# Patient Record
Sex: MALE | Race: OTHER | URBAN - METROPOLITAN AREA
[De-identification: names, ages, dates, MRNs, and addresses within clinical notes are randomized per-mention and may not be internally consistent; named-entity substitution may affect disease eponyms.]

---

## 2023-01-18 ENCOUNTER — INPATIENT (INPATIENT)
Facility: HOSPITAL | Age: 53
LOS: 3 days | Discharge: HOME | End: 2023-01-22
Attending: SURGERY | Admitting: SURGERY
Payer: COMMERCIAL

## 2023-01-18 VITALS
OXYGEN SATURATION: 99 % | TEMPERATURE: 98 F | WEIGHT: 279.99 LBS | HEART RATE: 84 BPM | DIASTOLIC BLOOD PRESSURE: 78 MMHG | RESPIRATION RATE: 16 BRPM | SYSTOLIC BLOOD PRESSURE: 180 MMHG

## 2023-01-18 LAB
ALBUMIN SERPL ELPH-MCNC: 4.8 G/DL — SIGNIFICANT CHANGE UP (ref 3.5–5.2)
ALP SERPL-CCNC: 48 U/L — SIGNIFICANT CHANGE UP (ref 30–115)
ALT FLD-CCNC: 67 U/L — HIGH (ref 0–41)
ANION GAP SERPL CALC-SCNC: 10 MMOL/L — SIGNIFICANT CHANGE UP (ref 7–14)
ANION GAP SERPL CALC-SCNC: 8 MMOL/L — SIGNIFICANT CHANGE UP (ref 7–14)
APTT BLD: 30.3 SEC — SIGNIFICANT CHANGE UP (ref 27–39.2)
AST SERPL-CCNC: 50 U/L — HIGH (ref 0–41)
BASOPHILS # BLD AUTO: 0.07 K/UL — SIGNIFICANT CHANGE UP (ref 0–0.2)
BASOPHILS NFR BLD AUTO: 0.6 % — SIGNIFICANT CHANGE UP (ref 0–1)
BILIRUB SERPL-MCNC: 0.5 MG/DL — SIGNIFICANT CHANGE UP (ref 0.2–1.2)
BLD GP AB SCN SERPL QL: SIGNIFICANT CHANGE UP
BUN SERPL-MCNC: 13 MG/DL — SIGNIFICANT CHANGE UP (ref 10–20)
BUN SERPL-MCNC: 18 MG/DL — SIGNIFICANT CHANGE UP (ref 10–20)
CALCIUM SERPL-MCNC: 9.1 MG/DL — SIGNIFICANT CHANGE UP (ref 8.4–10.4)
CALCIUM SERPL-MCNC: 9.8 MG/DL — SIGNIFICANT CHANGE UP (ref 8.4–10.5)
CHLORIDE SERPL-SCNC: 104 MMOL/L — SIGNIFICANT CHANGE UP (ref 98–110)
CHLORIDE SERPL-SCNC: 104 MMOL/L — SIGNIFICANT CHANGE UP (ref 98–110)
CK SERPL-CCNC: 425 U/L — HIGH (ref 0–225)
CO2 SERPL-SCNC: 25 MMOL/L — SIGNIFICANT CHANGE UP (ref 17–32)
CO2 SERPL-SCNC: 28 MMOL/L — SIGNIFICANT CHANGE UP (ref 17–32)
CREAT SERPL-MCNC: 1 MG/DL — SIGNIFICANT CHANGE UP (ref 0.7–1.5)
CREAT SERPL-MCNC: 1.1 MG/DL — SIGNIFICANT CHANGE UP (ref 0.7–1.5)
EGFR: 80 ML/MIN/1.73M2 — SIGNIFICANT CHANGE UP
EGFR: 90 ML/MIN/1.73M2 — SIGNIFICANT CHANGE UP
EOSINOPHIL # BLD AUTO: 0.26 K/UL — SIGNIFICANT CHANGE UP (ref 0–0.7)
EOSINOPHIL NFR BLD AUTO: 2.3 % — SIGNIFICANT CHANGE UP (ref 0–8)
GLUCOSE SERPL-MCNC: 100 MG/DL — HIGH (ref 70–99)
GLUCOSE SERPL-MCNC: 106 MG/DL — HIGH (ref 70–99)
HCT VFR BLD CALC: 42.6 % — SIGNIFICANT CHANGE UP (ref 42–52)
HCT VFR BLD CALC: 45 % — SIGNIFICANT CHANGE UP (ref 42–52)
HGB BLD-MCNC: 14.3 G/DL — SIGNIFICANT CHANGE UP (ref 14–18)
HGB BLD-MCNC: 15.1 G/DL — SIGNIFICANT CHANGE UP (ref 14–18)
IMM GRANULOCYTES NFR BLD AUTO: 0.4 % — HIGH (ref 0.1–0.3)
INR BLD: 1.03 RATIO — SIGNIFICANT CHANGE UP (ref 0.65–1.3)
LACTATE SERPL-SCNC: 1.2 MMOL/L — SIGNIFICANT CHANGE UP (ref 0.7–2)
LIDOCAIN IGE QN: 25 U/L — SIGNIFICANT CHANGE UP (ref 7–60)
LYMPHOCYTES # BLD AUTO: 1.81 K/UL — SIGNIFICANT CHANGE UP (ref 1.2–3.4)
LYMPHOCYTES # BLD AUTO: 16 % — LOW (ref 20.5–51.1)
MAGNESIUM SERPL-MCNC: 2 MG/DL — SIGNIFICANT CHANGE UP (ref 1.8–2.4)
MCHC RBC-ENTMCNC: 29.5 PG — SIGNIFICANT CHANGE UP (ref 27–31)
MCHC RBC-ENTMCNC: 29.8 PG — SIGNIFICANT CHANGE UP (ref 27–31)
MCHC RBC-ENTMCNC: 33.6 G/DL — SIGNIFICANT CHANGE UP (ref 32–37)
MCHC RBC-ENTMCNC: 33.6 G/DL — SIGNIFICANT CHANGE UP (ref 32–37)
MCV RBC AUTO: 88 FL — SIGNIFICANT CHANGE UP (ref 80–94)
MCV RBC AUTO: 88.8 FL — SIGNIFICANT CHANGE UP (ref 80–94)
MONOCYTES # BLD AUTO: 0.54 K/UL — SIGNIFICANT CHANGE UP (ref 0.1–0.6)
MONOCYTES NFR BLD AUTO: 4.8 % — SIGNIFICANT CHANGE UP (ref 1.7–9.3)
NEUTROPHILS # BLD AUTO: 8.61 K/UL — HIGH (ref 1.4–6.5)
NEUTROPHILS NFR BLD AUTO: 75.9 % — HIGH (ref 42.2–75.2)
NRBC # BLD: 0 /100 WBCS — SIGNIFICANT CHANGE UP (ref 0–0)
NRBC # BLD: 0 /100 WBCS — SIGNIFICANT CHANGE UP (ref 0–0)
PHOSPHATE SERPL-MCNC: 3.1 MG/DL — SIGNIFICANT CHANGE UP (ref 2.1–4.9)
PLATELET # BLD AUTO: 201 K/UL — SIGNIFICANT CHANGE UP (ref 130–400)
PLATELET # BLD AUTO: 206 K/UL — SIGNIFICANT CHANGE UP (ref 130–400)
POTASSIUM SERPL-MCNC: 4.3 MMOL/L — SIGNIFICANT CHANGE UP (ref 3.5–5)
POTASSIUM SERPL-MCNC: 4.5 MMOL/L — SIGNIFICANT CHANGE UP (ref 3.5–5)
POTASSIUM SERPL-SCNC: 4.3 MMOL/L — SIGNIFICANT CHANGE UP (ref 3.5–5)
POTASSIUM SERPL-SCNC: 4.5 MMOL/L — SIGNIFICANT CHANGE UP (ref 3.5–5)
PROT SERPL-MCNC: 8.1 G/DL — HIGH (ref 6–8)
PROTHROM AB SERPL-ACNC: 11.7 SEC — SIGNIFICANT CHANGE UP (ref 9.95–12.87)
RBC # BLD: 4.84 M/UL — SIGNIFICANT CHANGE UP (ref 4.7–6.1)
RBC # BLD: 5.07 M/UL — SIGNIFICANT CHANGE UP (ref 4.7–6.1)
RBC # FLD: 13.6 % — SIGNIFICANT CHANGE UP (ref 11.5–14.5)
RBC # FLD: 13.6 % — SIGNIFICANT CHANGE UP (ref 11.5–14.5)
SARS-COV-2 RNA SPEC QL NAA+PROBE: SIGNIFICANT CHANGE UP
SODIUM SERPL-SCNC: 139 MMOL/L — SIGNIFICANT CHANGE UP (ref 135–146)
SODIUM SERPL-SCNC: 140 MMOL/L — SIGNIFICANT CHANGE UP (ref 135–146)
WBC # BLD: 10.81 K/UL — HIGH (ref 4.8–10.8)
WBC # BLD: 11.34 K/UL — HIGH (ref 4.8–10.8)
WBC # FLD AUTO: 10.81 K/UL — HIGH (ref 4.8–10.8)
WBC # FLD AUTO: 11.34 K/UL — HIGH (ref 4.8–10.8)

## 2023-01-18 PROCEDURE — 73030 X-RAY EXAM OF SHOULDER: CPT | Mod: 26,RT

## 2023-01-18 PROCEDURE — 73562 X-RAY EXAM OF KNEE 3: CPT | Mod: 26,RT

## 2023-01-18 PROCEDURE — 73100 X-RAY EXAM OF WRIST: CPT | Mod: 26,LT

## 2023-01-18 PROCEDURE — 74177 CT ABD & PELVIS W/CONTRAST: CPT | Mod: 26,MA

## 2023-01-18 PROCEDURE — 73120 X-RAY EXAM OF HAND: CPT | Mod: 26,LT

## 2023-01-18 PROCEDURE — 99285 EMERGENCY DEPT VISIT HI MDM: CPT

## 2023-01-18 PROCEDURE — 72146 MRI CHEST SPINE W/O DYE: CPT | Mod: 26

## 2023-01-18 PROCEDURE — 99223 1ST HOSP IP/OBS HIGH 75: CPT

## 2023-01-18 PROCEDURE — 70450 CT HEAD/BRAIN W/O DYE: CPT | Mod: 26,MA

## 2023-01-18 PROCEDURE — 71260 CT THORAX DX C+: CPT | Mod: 26,MA

## 2023-01-18 PROCEDURE — 72125 CT NECK SPINE W/O DYE: CPT | Mod: 26,MA

## 2023-01-18 RX ORDER — SODIUM CHLORIDE 9 MG/ML
1000 INJECTION, SOLUTION INTRAVENOUS
Refills: 0 | Status: DISCONTINUED | OUTPATIENT
Start: 2023-01-18 | End: 2023-01-19

## 2023-01-18 RX ORDER — OXYCODONE HYDROCHLORIDE 5 MG/1
5 TABLET ORAL EVERY 6 HOURS
Refills: 0 | Status: DISCONTINUED | OUTPATIENT
Start: 2023-01-18 | End: 2023-01-20

## 2023-01-18 RX ORDER — HYDROMORPHONE HYDROCHLORIDE 2 MG/ML
0.5 INJECTION INTRAMUSCULAR; INTRAVENOUS; SUBCUTANEOUS ONCE
Refills: 0 | Status: DISCONTINUED | OUTPATIENT
Start: 2023-01-18 | End: 2023-01-18

## 2023-01-18 RX ORDER — HYDROMORPHONE HYDROCHLORIDE 2 MG/ML
0.5 INJECTION INTRAMUSCULAR; INTRAVENOUS; SUBCUTANEOUS EVERY 4 HOURS
Refills: 0 | Status: DISCONTINUED | OUTPATIENT
Start: 2023-01-18 | End: 2023-01-18

## 2023-01-18 RX ORDER — PANTOPRAZOLE SODIUM 20 MG/1
40 TABLET, DELAYED RELEASE ORAL
Refills: 0 | Status: DISCONTINUED | OUTPATIENT
Start: 2023-01-18 | End: 2023-01-22

## 2023-01-18 RX ORDER — ENOXAPARIN SODIUM 100 MG/ML
40 INJECTION SUBCUTANEOUS EVERY 24 HOURS
Refills: 0 | Status: DISCONTINUED | OUTPATIENT
Start: 2023-01-18 | End: 2023-01-22

## 2023-01-18 RX ORDER — GABAPENTIN 400 MG/1
300 CAPSULE ORAL EVERY 8 HOURS
Refills: 0 | Status: DISCONTINUED | OUTPATIENT
Start: 2023-01-18 | End: 2023-01-22

## 2023-01-18 RX ORDER — SENNA PLUS 8.6 MG/1
2 TABLET ORAL AT BEDTIME
Refills: 0 | Status: DISCONTINUED | OUTPATIENT
Start: 2023-01-18 | End: 2023-01-22

## 2023-01-18 RX ORDER — MORPHINE SULFATE 50 MG/1
6 CAPSULE, EXTENDED RELEASE ORAL ONCE
Refills: 0 | Status: DISCONTINUED | OUTPATIENT
Start: 2023-01-18 | End: 2023-01-18

## 2023-01-18 RX ORDER — DEXAMETHASONE 0.5 MG/5ML
10 ELIXIR ORAL ONCE
Refills: 0 | Status: COMPLETED | OUTPATIENT
Start: 2023-01-18 | End: 2023-01-18

## 2023-01-18 RX ORDER — SODIUM CHLORIDE 9 MG/ML
500 INJECTION INTRAMUSCULAR; INTRAVENOUS; SUBCUTANEOUS ONCE
Refills: 0 | Status: COMPLETED | OUTPATIENT
Start: 2023-01-18 | End: 2023-01-18

## 2023-01-18 RX ORDER — ONDANSETRON 8 MG/1
4 TABLET, FILM COATED ORAL ONCE
Refills: 0 | Status: COMPLETED | OUTPATIENT
Start: 2023-01-18 | End: 2023-01-18

## 2023-01-18 RX ORDER — MORPHINE SULFATE 50 MG/1
4 CAPSULE, EXTENDED RELEASE ORAL ONCE
Refills: 0 | Status: DISCONTINUED | OUTPATIENT
Start: 2023-01-18 | End: 2023-01-18

## 2023-01-18 RX ORDER — SODIUM CHLORIDE 9 MG/ML
1000 INJECTION INTRAMUSCULAR; INTRAVENOUS; SUBCUTANEOUS ONCE
Refills: 0 | Status: COMPLETED | OUTPATIENT
Start: 2023-01-18 | End: 2023-01-18

## 2023-01-18 RX ORDER — LIDOCAINE 4 G/100G
1 CREAM TOPICAL DAILY
Refills: 0 | Status: DISCONTINUED | OUTPATIENT
Start: 2023-01-18 | End: 2023-01-22

## 2023-01-18 RX ORDER — ACETAMINOPHEN 500 MG
650 TABLET ORAL EVERY 6 HOURS
Refills: 0 | Status: DISCONTINUED | OUTPATIENT
Start: 2023-01-18 | End: 2023-01-22

## 2023-01-18 RX ADMIN — MORPHINE SULFATE 6 MILLIGRAM(S): 50 CAPSULE, EXTENDED RELEASE ORAL at 14:33

## 2023-01-18 RX ADMIN — Medication 10 MILLIGRAM(S): at 15:58

## 2023-01-18 RX ADMIN — ONDANSETRON 4 MILLIGRAM(S): 8 TABLET, FILM COATED ORAL at 11:51

## 2023-01-18 RX ADMIN — HYDROMORPHONE HYDROCHLORIDE 0.5 MILLIGRAM(S): 2 INJECTION INTRAMUSCULAR; INTRAVENOUS; SUBCUTANEOUS at 18:52

## 2023-01-18 RX ADMIN — ENOXAPARIN SODIUM 40 MILLIGRAM(S): 100 INJECTION SUBCUTANEOUS at 21:06

## 2023-01-18 RX ADMIN — MORPHINE SULFATE 4 MILLIGRAM(S): 50 CAPSULE, EXTENDED RELEASE ORAL at 11:51

## 2023-01-18 RX ADMIN — GABAPENTIN 300 MILLIGRAM(S): 400 CAPSULE ORAL at 21:06

## 2023-01-18 RX ADMIN — OXYCODONE HYDROCHLORIDE 5 MILLIGRAM(S): 5 TABLET ORAL at 21:06

## 2023-01-18 RX ADMIN — Medication 650 MILLIGRAM(S): at 23:16

## 2023-01-18 RX ADMIN — Medication 650 MILLIGRAM(S): at 18:52

## 2023-01-18 RX ADMIN — SODIUM CHLORIDE 500 MILLILITER(S): 9 INJECTION INTRAMUSCULAR; INTRAVENOUS; SUBCUTANEOUS at 12:03

## 2023-01-18 RX ADMIN — SODIUM CHLORIDE 1000 MILLILITER(S): 9 INJECTION INTRAMUSCULAR; INTRAVENOUS; SUBCUTANEOUS at 16:07

## 2023-01-18 NOTE — H&P ADULT - NSHPPHYSICALEXAM_GEN_ALL_CORE
Primary Survey:    A - airway intact  B - bilateral breath sounds and good chest rise  C - palpable pulses in all extremities  D - GCS 15 on arrival, PINEDA  Exposure obtained    Vital Signs Last 24 Hrs  T(C): 36.6 (18 Jan 2023 10:30), Max: 36.6 (18 Jan 2023 10:30)  T(F): 97.9 (18 Jan 2023 10:30), Max: 97.9 (18 Jan 2023 10:30)  HR: 62 (18 Jan 2023 15:54) (62 - 84)  BP: 130/65 (18 Jan 2023 15:54) (100/54 - 180/78)  BP(mean): --  RR: 16 (18 Jan 2023 14:39) (16 - 16)  SpO2: 98% (18 Jan 2023 14:39) (98% - 99%)    Parameters below as of 18 Jan 2023 14:39  Patient On (Oxygen Delivery Method): room air        Secondary Survey:   General: NAD  HEENT: Normocephalic, atraumatic, EOMI, PEERLA. no scalp lacerations   Neck: Soft, midline trachea. no c-spine tenderness  Chest: +Right posterior chest wall tenderness, no subcutaneous emphysema   Cardiac: S1, S2, RRR  Respiratory: Bilateral breath sounds, clear and equal bilaterally. Decreased effort 2/2 pain. Unable to perform Incentive.  Abdomen: Soft, non-distended, mildly tender mostly in RUQ RLQ, no rebound, no guarding.  Groin: Normal appearing, pelvis stable   Ext:  Moving b/l upper and lower extremities. But decreased ROM on right 2/2 pain  Back: +Thoracic spine tenderness, - cervical or lumbar tenderness. No palpable runoff/stepoff/deformity
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
## 2023-01-18 NOTE — H&P ADULT - NSHPLABSRESULTS_GEN_ALL_CORE
3/31/2022    PRIMARY: Azucena Carrero NP      ASSESSMENT:    1. Orthostatic hypotension    2. Medication monitoring encounter        · Orthostatic hypotension:  Persists on exam today 20 mm rise in heart rate and 20 mm drop in blood pressure.  This is despite being very well hydrated and taking Florinef 0.1 mg daily.  Symptomatic episodes have significantly improved on Florinef.    PLAN:  · He will continue Florinef for now  · I recommend we do a trial off of Florinef and come in that week to recheck orthostatic readings.  If his symptoms recur off Florinef then we would obviously have to continue it.    Patient Instructions   Work letter sent to your [employer] account    Call the office when you would like to trial off the Florinef for a week so we can arrange a nurse blood pressure visit.     Jessica Yi MA, Caren SNOW, Latosha RN, Eboni RN and I were pleased to meet with you today.     HERE IS SOME IMPORTANT INFORMATION FOR OUR PATIENTS   If you need refills - call your pharmacist and they will contact our office.   If you have medical concerns after hours, and to make appointments, call [phone].  If you have had testing done, anticipate results to be relayed to you within 3-5 days.  If you have not heard from our staff, please contact the office.     If you have a question during office hours call [phone].  You will eventually speak with my nurses.  If you need to speak to me directly, let them know and I will get back to you as soon as possible.  Better yet, you can consider signing up for [email or web address], our popular online communication portal to ask for a refill or contact our staff.  Go to:  [email or web address].    We encourage use of the patient portal to view results and communicate directly with staff with non-urgent concerns.    Dr. Zeeshan Whitfield MD          No orders of the defined types were placed in this encounter.    Return in about 1 year (around 3/31/2023) for with pre clinic , CMP. Do  orthostatic vitals.    Kd Louis is a 36 year old male here for evaluation of:  1. Orthostatic hypotension    2. Medication monitoring encounter        SUBJECTIVE:  He had rare episodes of lightheadedness.  He has had no syncope.  He is taking Florinef 0.1 a day.  He keeps well hydrated.  No significant alcohol or caffeine intake.  He still runs a quite a bit.    Cardiac Diagnostics  Event Monitor 7/31/2018: Normal sinus rhythm  Echo 5/1/2018: Normal, EF 62%.  Stress Test 5/1/2018: Normal, no ST changes, no angina.    2018 consult:  Kd Louis is a 32 year old male who about a month ago had a viral syndrome with cough and fatigue. Since then he's had recurrent syncopal episodes. These will occur always after he gets up from a sitting or lying position and he'll feel suddenly lightheaded and then pass out. This happened 4 times in one day over the weekend and went to the emergency room. Workup showed that he was orthostatic with a drop in the blood pressure and a rise in heart rate with position change. He was treated with a liter of fluid. He has had another syncopal episode since then including this morning.     The patient is an avid runner. He has noted since this viral syndrome that his exercise capacity is worse. He's able to run but he is much more short of breath. No chest pain. He's had no syncope or near syncope while running.     He was a heavy alcohol user. Up to 6 beers to 1 L of vodka every day but he quit 90 days ago.     He had lymphoma when he was 18. He had chemotherapy and radiation to the chest area.     ROS:Review of systems as above otherwise negative.    Current Outpatient Medications   Medication Sig   • buPROPion XL (WELLBUTRIN XL) 150 MG 24 hr tablet Take 1 tablet by mouth 2 times daily.   • levothyroxine 88 MCG tablet Take 1 tablet by mouth daily.   • fludrocortisone (FLORINEF) 0.1 MG tablet TAKE 2 TABLETS BY MOUTH DAILY. SCHEDULE OFFICE VISIT FOR EDVIN YARBROUGH.   • busPIRone  (BUSPAR) 7.5 MG tablet TAKE 1 TABLET BY MOUTH TWICE DAILY   • OMEPRAZOLE PO Take 20 mg by mouth daily.    • fluticasone propionate (FLOVENT HFA) 110 MCG/ACT inhaler Inhale 1 puff into the lungs 2 times daily.   • albuterol 108 (90 Base) MCG/ACT inhaler INHALE 2 PUFFS INTO THE LUNGS EVERY 4 HOURS AS NEEDED FOR SHORTNESS OF BREATH     No current facility-administered medications for this visit.       OBJECTIVE:  I have reviewed the patient's medications and allergies, problem list, past medical, surgical, social and family history, spending a lot of time updating these as appropriate, especially the problem list.  See Histories section of the EMR for a display of this information.       PHYSICAL EXAM:  Vitals:    03/31/22 1050   BP: 125/87   Pulse: (!) 108     BP Readings from Last 4 Encounters:   03/31/22 125/87   02/11/22 120/88   02/04/22 138/82   12/20/21 122/80     Wt Readings from Last 4 Encounters:   03/31/22 98.4 kg (217 lb)   02/11/22 93.6 kg (206 lb 6.4 oz)   02/04/22 91.6 kg (202 lb)   12/20/21 94.8 kg (209 lb)     Body mass index is 30.27 kg/m².  General: no distress   Lungs: Clear to auscultation bilaterally  Heart: Regular rate and rhythm, S1, S2 normal, no murmur, click, rub or gallop    LABS:  Recent Labs   Lab 02/11/22  0926   TSH 4.337       Ejection Fraction   Date Value Ref Range Status   05/01/2018 62.0 % Final                                          Labs:                          15.1   11.34 )-----------( 206      ( 18 Jan 2023 12:00 )             45.0       Auto Neutrophil %: 75.9 % (01-18-23 @ 12:00)  Auto Immature Granulocyte %: 0.4 % (01-18-23 @ 12:00)    01-18    140  |  104  |  18  ----------------------------<  100<H>  4.5   |  28  |  1.1      Calcium, Total Serum: 9.8 mg/dL (01-18-23 @ 12:00)      LFTs:             8.1  | 0.5  | 50       ------------------[48      ( 18 Jan 2023 12:00 )  4.8  | x    | 67          Lipase:25     Amylase:x             Coags:     11.70  ----< 1.03    ( 18 Jan 2023 12:00 )     30.3              RADIOLOGY & ADDITIONAL STUDIES:    < from: CT Head No Cont (01.18.23 @ 12:05) >    IMPRESSION:    CT HEAD:  No acute intracranial findings.    CT CERVICAL SPINE:  No acute cervical fracture or facet subluxation.    --- End of Report ---    < end of copied text >    < from: CT Chest w/ IV Cont (01.18.23 @ 12:28) >    IMPRESSION:    *1.  Acute fracture along the right superolateral aspect of the T8   vertebral body and right posterior 8th rib adjacent to the costovertebral   junction, with small adjacent prevertebral hematoma and bilateral   posterior subpleural hematoma, right greater than left.  2. Otherwise, no definite evidence of acute traumatic injury within the   abdomen or pelvis.  3. Diffuse hepatic steatosis with indeterminate bilobar hypoattenuating   hepatic foci, measuring up to 1.8 cm within the right hepatic lobe;   further evaluation with nonemergent contrast-enhanced liver protocol MRI   is suggested when patient is able.      *Spoke with HARRY SUAZO MD; Attending Em on 1/18/2023 12:58 PM with   readback.    --- End of Report ---    < end of copied text >      ---------------------------------------------------------------------------------------

## 2023-01-18 NOTE — ED PROVIDER NOTE - PHYSICAL EXAMINATION
exam: nad, ncat, perrl, eomi, mmm, rrr, ctab, R lateral rib ttp, abd soft, ttp RLQ and LLQ, nd aox3, no calf tenderness, no pitting edema
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
## 2023-01-18 NOTE — ED PROVIDER NOTE - NS ED MD DISPO DIVISION
Expected Date Of Service: 05/14/2021 [facility] Bill For Surgical Tray: no Billing Type: Third-Party Bill
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
## 2023-01-19 PROCEDURE — 71045 X-RAY EXAM CHEST 1 VIEW: CPT | Mod: 26

## 2023-01-19 PROCEDURE — 99233 SBSQ HOSP IP/OBS HIGH 50: CPT

## 2023-01-19 RX ORDER — KETOROLAC TROMETHAMINE 30 MG/ML
15 SYRINGE (ML) INJECTION EVERY 6 HOURS
Refills: 0 | Status: DISCONTINUED | OUTPATIENT
Start: 2023-01-19 | End: 2023-01-19

## 2023-01-19 RX ORDER — KETOROLAC TROMETHAMINE 30 MG/ML
30 SYRINGE (ML) INJECTION EVERY 8 HOURS
Refills: 0 | Status: COMPLETED | OUTPATIENT
Start: 2023-01-19 | End: 2023-01-22

## 2023-01-19 RX ORDER — HYDROMORPHONE HYDROCHLORIDE 2 MG/ML
0.5 INJECTION INTRAMUSCULAR; INTRAVENOUS; SUBCUTANEOUS EVERY 4 HOURS
Refills: 0 | Status: DISCONTINUED | OUTPATIENT
Start: 2023-01-19 | End: 2023-01-20

## 2023-01-19 RX ADMIN — GABAPENTIN 300 MILLIGRAM(S): 400 CAPSULE ORAL at 05:35

## 2023-01-19 RX ADMIN — OXYCODONE HYDROCHLORIDE 5 MILLIGRAM(S): 5 TABLET ORAL at 21:45

## 2023-01-19 RX ADMIN — SENNA PLUS 2 TABLET(S): 8.6 TABLET ORAL at 21:45

## 2023-01-19 RX ADMIN — GABAPENTIN 300 MILLIGRAM(S): 400 CAPSULE ORAL at 21:44

## 2023-01-19 RX ADMIN — OXYCODONE HYDROCHLORIDE 5 MILLIGRAM(S): 5 TABLET ORAL at 05:36

## 2023-01-19 RX ADMIN — Medication 650 MILLIGRAM(S): at 18:19

## 2023-01-19 RX ADMIN — HYDROMORPHONE HYDROCHLORIDE 0.5 MILLIGRAM(S): 2 INJECTION INTRAMUSCULAR; INTRAVENOUS; SUBCUTANEOUS at 10:14

## 2023-01-19 RX ADMIN — Medication 30 MILLIGRAM(S): at 21:44

## 2023-01-19 RX ADMIN — ENOXAPARIN SODIUM 40 MILLIGRAM(S): 100 INJECTION SUBCUTANEOUS at 21:44

## 2023-01-19 RX ADMIN — HYDROMORPHONE HYDROCHLORIDE 0.5 MILLIGRAM(S): 2 INJECTION INTRAMUSCULAR; INTRAVENOUS; SUBCUTANEOUS at 09:46

## 2023-01-19 RX ADMIN — Medication 650 MILLIGRAM(S): at 00:00

## 2023-01-19 RX ADMIN — GABAPENTIN 300 MILLIGRAM(S): 400 CAPSULE ORAL at 15:59

## 2023-01-19 RX ADMIN — LIDOCAINE 1 PATCH: 4 CREAM TOPICAL at 15:58

## 2023-01-19 RX ADMIN — Medication 650 MILLIGRAM(S): at 05:35

## 2023-01-19 RX ADMIN — SODIUM CHLORIDE 125 MILLILITER(S): 9 INJECTION, SOLUTION INTRAVENOUS at 09:03

## 2023-01-19 RX ADMIN — PANTOPRAZOLE SODIUM 40 MILLIGRAM(S): 20 TABLET, DELAYED RELEASE ORAL at 05:35

## 2023-01-19 RX ADMIN — Medication 15 MILLIGRAM(S): at 13:10
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
## 2023-01-19 NOTE — PROGRESS NOTE ADULT - SUBJECTIVE AND OBJECTIVE BOX
TRAUMA SURGERY PROGRESS NOTE    Patient: LUZ WANG , 52y ([birth year])Male   MRN: [id number]  Location: [facility] ER Hold 012 A  Visit: 01-18-23 Inpatient  Date: 01-19-23 @ 07:04    Procedure/Dx/Injuries: T8 VB fx, R posterior 8th rib fx, b/l subpleural hematoma    Events of past 24 hours: MRI requested by neurosurgery showed no reilly epidural hematoma or cord compression with possible discontinuity in the anterior longitudinal ligament. Neurosurgery recommended TLSO brace, but until the brace is delivered he is to maintain strict spinal precautions and be log rolled if he needs to move. Patient reports improvement in his pain level, and the medications help control his pain. Denies N/V/dizziness.     PAST MEDICAL & SURGICAL HISTORY:      Vitals:   T(F): 97 (01-19-23 @ 05:00), Max: 98.8 (01-18-23 @ 18:48)  HR: 60 (01-19-23 @ 06:00)  BP: 117/74 (01-19-23 @ 06:00)  RR: 18 (01-19-23 @ 06:00)  SpO2: 97% (01-19-23 @ 06:00)          Fluids: lactated ringers.: Solution, 1000 milliLiter(s) infuse at 125 mL/Hr      I & O's:    PHYSICAL EXAM:  General: NAD  HEENT: Normocephalic, atraumatic  Neck: Soft, midline trachea. no c-spine tenderness  Chest: +Right posterior chest wall tenderness, no subcutaneous emphysema   Cardiac: S1, S2  Respiratory: Bilateral breath sounds, clear and equal bilaterally. Decreased effort 2/2 pain  Abdomen: Soft, non-distended, mildly tender mostly in RUQ RLQ (improving), no rebound, no guarding.  Ext:  Moving b/l upper and lower extremities. But decreased ROM on right 2/2 pain  Back: +Thoracic spine tenderness, - cervical or lumbar tenderness    MEDICATIONS  (STANDING):  acetaminophen     Tablet .. 650 milliGRAM(s) Oral every 6 hours  enoxaparin Injectable 40 milliGRAM(s) SubCutaneous every 24 hours  gabapentin 300 milliGRAM(s) Oral every 8 hours  lactated ringers. 1000 milliLiter(s) (125 mL/Hr) IV Continuous <Continuous>  lidocaine   4% Patch 1 Patch Transdermal daily  pantoprazole    Tablet 40 milliGRAM(s) Oral before breakfast  senna 2 Tablet(s) Oral at bedtime    MEDICATIONS  (PRN):  oxyCODONE    IR 5 milliGRAM(s) Oral every 6 hours PRN Moderate Pain (4 - 6)      DVT PROPHYLAXIS: SCDs, enoxaparin Injectable 40 milliGRAM(s) SubCutaneous every 24 hours    GI PROPHYLAXIS: pantoprazole    Tablet 40 milliGRAM(s) Oral before breakfast    ANTICOAGULATION:   ANTIBIOTICS:           LAB/STUDIES:  Labs:  CAPILLARY BLOOD GLUCOSE                              14.3   10.81 )-----------( 201      ( 18 Jan 2023 20:26 )             42.6       Auto Neutrophil %: 75.9 % (01-18-23 @ 12:00)  Auto Immature Granulocyte %: 0.4 % (01-18-23 @ 12:00)    01-18    139  |  104  |  13  ----------------------------<  106<H>  4.3   |  25  |  1.0      Calcium, Total Serum: 9.1 mg/dL (01-18-23 @ 20:26)      LFTs:             8.1  | 0.5  | 50       ------------------[48      ( 18 Jan 2023 12:00 )  4.8  | x    | 67          Lipase:25     Amylase:x         Lactate, Blood: 1.2 mmol/L (01-18-23 @ 20:26)      Coags:     11.70  ----< 1.03    ( 18 Jan 2023 12:00 )     30.3        CARDIAC MARKERS ( 18 Jan 2023 20:26 )  x     / x     / 425 U/L / x     / x          IMAGING:  < from: CT Head No Cont (01.18.23 @ 12:05) >  CT HEAD:  No acute intracranial findings.    CT CERVICAL SPINE:  No acute cervical fracture or facet subluxation.    < end of copied text >    < from: CT Chest w/ IV Cont (01.18.23 @ 12:28) >    *1.  Acute fracture along the right superolateral aspect of the T8   vertebral body and right posterior 8th rib adjacent to the costovertebral   junction, with small adjacent prevertebral hematoma and bilateral   posterior subpleural hematoma, right greater than left.  2. Otherwise, no definite evidence of acute traumatic injury within the   abdomen or pelvis.  3. Diffuse hepatic steatosis with indeterminate bilobar hypoattenuating   hepatic foci, measuring up to 1.8 cm within the right hepatic lobe;   further evaluation with nonemergent contrast-enhanced liver protocol MRI   is suggested when patient is able.      < end of copied text >    < from: MR Thoracic Spine No Cont (01.18.23 @ 18:20) >  Redemonstrated nondisplaced acute fractures in the superior endplate of   T8 and in the adjacent medial right eighth rib. Trace bilateral facet   joint effusions at T7-8, likely traumatic in etiology.    Trace patchy fluid signal in the ventral epidural fat at T8 likely   representing mild edema in association with T8 fracture. No reilly   epidural hematoma or cord compression.    Questionable discontinuity in the anterior longitudinal ligament at T8   which may reflect ligamentous injury.    < end of copied text >
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
## 2023-01-20 LAB
ANION GAP SERPL CALC-SCNC: 5 MMOL/L — LOW (ref 7–14)
BASOPHILS # BLD AUTO: 0.08 K/UL — SIGNIFICANT CHANGE UP (ref 0–0.2)
BASOPHILS NFR BLD AUTO: 0.8 % — SIGNIFICANT CHANGE UP (ref 0–1)
BUN SERPL-MCNC: 12 MG/DL — SIGNIFICANT CHANGE UP (ref 10–20)
CALCIUM SERPL-MCNC: 8.6 MG/DL — SIGNIFICANT CHANGE UP (ref 8.4–10.4)
CHLORIDE SERPL-SCNC: 104 MMOL/L — SIGNIFICANT CHANGE UP (ref 98–110)
CK SERPL-CCNC: 239 U/L — HIGH (ref 0–225)
CO2 SERPL-SCNC: 30 MMOL/L — SIGNIFICANT CHANGE UP (ref 17–32)
CREAT SERPL-MCNC: 1.1 MG/DL — SIGNIFICANT CHANGE UP (ref 0.7–1.5)
EGFR: 80 ML/MIN/1.73M2 — SIGNIFICANT CHANGE UP
EOSINOPHIL # BLD AUTO: 0.6 K/UL — SIGNIFICANT CHANGE UP (ref 0–0.7)
EOSINOPHIL NFR BLD AUTO: 6 % — SIGNIFICANT CHANGE UP (ref 0–8)
GLUCOSE SERPL-MCNC: 111 MG/DL — HIGH (ref 70–99)
HCT VFR BLD CALC: 39.1 % — LOW (ref 42–52)
HGB BLD-MCNC: 12.9 G/DL — LOW (ref 14–18)
IMM GRANULOCYTES NFR BLD AUTO: 0.3 % — SIGNIFICANT CHANGE UP (ref 0.1–0.3)
LYMPHOCYTES # BLD AUTO: 3.44 K/UL — HIGH (ref 1.2–3.4)
LYMPHOCYTES # BLD AUTO: 34.6 % — SIGNIFICANT CHANGE UP (ref 20.5–51.1)
MAGNESIUM SERPL-MCNC: 2 MG/DL — SIGNIFICANT CHANGE UP (ref 1.8–2.4)
MCHC RBC-ENTMCNC: 29.5 PG — SIGNIFICANT CHANGE UP (ref 27–31)
MCHC RBC-ENTMCNC: 33 G/DL — SIGNIFICANT CHANGE UP (ref 32–37)
MCV RBC AUTO: 89.5 FL — SIGNIFICANT CHANGE UP (ref 80–94)
MONOCYTES # BLD AUTO: 0.7 K/UL — HIGH (ref 0.1–0.6)
MONOCYTES NFR BLD AUTO: 7 % — SIGNIFICANT CHANGE UP (ref 1.7–9.3)
NEUTROPHILS # BLD AUTO: 5.09 K/UL — SIGNIFICANT CHANGE UP (ref 1.4–6.5)
NEUTROPHILS NFR BLD AUTO: 51.3 % — SIGNIFICANT CHANGE UP (ref 42.2–75.2)
NRBC # BLD: 0 /100 WBCS — SIGNIFICANT CHANGE UP (ref 0–0)
PLATELET # BLD AUTO: 187 K/UL — SIGNIFICANT CHANGE UP (ref 130–400)
POTASSIUM SERPL-MCNC: 3.7 MMOL/L — SIGNIFICANT CHANGE UP (ref 3.5–5)
POTASSIUM SERPL-SCNC: 3.7 MMOL/L — SIGNIFICANT CHANGE UP (ref 3.5–5)
RBC # BLD: 4.37 M/UL — LOW (ref 4.7–6.1)
RBC # FLD: 13.9 % — SIGNIFICANT CHANGE UP (ref 11.5–14.5)
SODIUM SERPL-SCNC: 139 MMOL/L — SIGNIFICANT CHANGE UP (ref 135–146)
WBC # BLD: 9.94 K/UL — SIGNIFICANT CHANGE UP (ref 4.8–10.8)
WBC # FLD AUTO: 9.94 K/UL — SIGNIFICANT CHANGE UP (ref 4.8–10.8)

## 2023-01-20 PROCEDURE — 99233 SBSQ HOSP IP/OBS HIGH 50: CPT | Mod: 24,25

## 2023-01-20 RX ORDER — OXYCODONE HYDROCHLORIDE 5 MG/1
5 TABLET ORAL EVERY 8 HOURS
Refills: 0 | Status: DISCONTINUED | OUTPATIENT
Start: 2023-01-20 | End: 2023-01-22

## 2023-01-20 RX ADMIN — GABAPENTIN 300 MILLIGRAM(S): 400 CAPSULE ORAL at 14:23

## 2023-01-20 RX ADMIN — Medication 30 MILLIGRAM(S): at 05:26

## 2023-01-20 RX ADMIN — OXYCODONE HYDROCHLORIDE 5 MILLIGRAM(S): 5 TABLET ORAL at 21:13

## 2023-01-20 RX ADMIN — Medication 650 MILLIGRAM(S): at 18:01

## 2023-01-20 RX ADMIN — OXYCODONE HYDROCHLORIDE 5 MILLIGRAM(S): 5 TABLET ORAL at 05:28

## 2023-01-20 RX ADMIN — GABAPENTIN 300 MILLIGRAM(S): 400 CAPSULE ORAL at 21:12

## 2023-01-20 RX ADMIN — LIDOCAINE 1 PATCH: 4 CREAM TOPICAL at 11:46

## 2023-01-20 RX ADMIN — Medication 650 MILLIGRAM(S): at 14:15

## 2023-01-20 RX ADMIN — Medication 30 MILLIGRAM(S): at 05:30

## 2023-01-20 RX ADMIN — Medication 650 MILLIGRAM(S): at 06:00

## 2023-01-20 RX ADMIN — Medication 30 MILLIGRAM(S): at 14:22

## 2023-01-20 RX ADMIN — Medication 650 MILLIGRAM(S): at 05:26

## 2023-01-20 RX ADMIN — Medication 30 MILLIGRAM(S): at 14:00

## 2023-01-20 RX ADMIN — Medication 30 MILLIGRAM(S): at 21:13

## 2023-01-20 RX ADMIN — Medication 650 MILLIGRAM(S): at 11:45

## 2023-01-20 RX ADMIN — OXYCODONE HYDROCHLORIDE 5 MILLIGRAM(S): 5 TABLET ORAL at 06:00

## 2023-01-20 RX ADMIN — Medication 650 MILLIGRAM(S): at 23:42

## 2023-01-20 RX ADMIN — GABAPENTIN 300 MILLIGRAM(S): 400 CAPSULE ORAL at 05:26

## 2023-01-20 RX ADMIN — LIDOCAINE 1 PATCH: 4 CREAM TOPICAL at 19:06

## 2023-01-20 RX ADMIN — PANTOPRAZOLE SODIUM 40 MILLIGRAM(S): 20 TABLET, DELAYED RELEASE ORAL at 09:16

## 2023-01-20 RX ADMIN — ENOXAPARIN SODIUM 40 MILLIGRAM(S): 100 INJECTION SUBCUTANEOUS at 21:12

## 2023-01-20 RX ADMIN — Medication 650 MILLIGRAM(S): at 19:06

## 2023-01-20 RX ADMIN — SENNA PLUS 2 TABLET(S): 8.6 TABLET ORAL at 21:13
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
## 2023-01-20 NOTE — PROGRESS NOTE ADULT - SUBJECTIVE AND OBJECTIVE BOX
GENERAL SURGERY PROGRESS NOTE    Patient: LUZ WANG , 52y ([birth year])Male   MRN: [id number]  Location: [facility] A  Visit: 01-18-23 Inpatient  Date: 01-20-23 @ 08:29    Hospital Day #: 3    Procedure/Dx/Injuries: TB VB fracture, Rt posterior 8th rib fracture. BL subpleural hematoma    Events of past 24 hours: Awaiting TLSO  brace to be delivered, the patient needs to ambulate with PT after TLSO brace is delivered.     PAST MEDICAL & SURGICAL HISTORY:      Vitals:   T(F): 97 (01-20-23 @ 07:00), Max: 98.6 (01-19-23 @ 15:41)  HR: 58 (01-20-23 @ 07:00)  BP: 135/63 (01-20-23 @ 07:00)  RR: 17 (01-20-23 @ 07:00)  SpO2: 97% (01-20-23 @ 07:00)      Diet, Regular    PHYSICAL EXAM:  GENERAL: NAD, well-appearing  CHEST/LUNG: Clear to auscultation bilaterally  HEART: Regular rate and rhythm  ABDOMEN: Soft, Nontender, Nondistended;   EXTREMITIES:  No clubbing, cyanosis, or edema      MEDICATIONS  (STANDING):  acetaminophen     Tablet .. 650 milliGRAM(s) Oral every 6 hours  enoxaparin Injectable 40 milliGRAM(s) SubCutaneous every 24 hours  gabapentin 300 milliGRAM(s) Oral every 8 hours  ketorolac   Injectable 30 milliGRAM(s) IV Push every 8 hours  lidocaine   4% Patch 1 Patch Transdermal daily  pantoprazole    Tablet 40 milliGRAM(s) Oral before breakfast  senna 2 Tablet(s) Oral at bedtime    MEDICATIONS  (PRN):  HYDROmorphone  Injectable 0.5 milliGRAM(s) IV Push every 4 hours PRN Severe Pain (7 - 10)  oxyCODONE    IR 5 milliGRAM(s) Oral every 6 hours PRN Moderate Pain (4 - 6)      DVT PROPHYLAXIS: enoxaparin Injectable 40 milliGRAM(s) SubCutaneous every 24 hours    GI PROPHYLAXIS: pantoprazole    Tablet 40 milliGRAM(s) Oral before breakfast    LAB/STUDIES:  Labs:  CAPILLARY BLOOD GLUCOSE                          14.3   10.81 )-----------( 201      ( 18 Jan 2023 20:26 )             42.6         01-18    139  |  104  |  13  ----------------------------<  106<H>  4.3   |  25  |  1.0      LFTs:             8.1  | 0.5  | 50       ------------------[48      ( 18 Jan 2023 12:00 )  4.8  | x    | 67          Lipase:25        Lactate, Blood: 1.2 mmol/L (01-18-23 @ 20:26)    Coags:     11.70  ----< 1.03    ( 18 Jan 2023 12:00 )     30.3        CARDIAC MARKERS ( 18 Jan 2023 20:26 )  x     / x     / 425 U/L / x     / x        IMAGING:  n/a    ACCESS/ DEVICES:  [x ] Peripheral IV

## 2023-01-21 RX ORDER — OXYCODONE HYDROCHLORIDE 5 MG/1
1 TABLET ORAL
Qty: 8 | Refills: 0
Start: 2023-01-21 | End: 2023-01-22

## 2023-01-21 RX ADMIN — ENOXAPARIN SODIUM 40 MILLIGRAM(S): 100 INJECTION SUBCUTANEOUS at 21:26

## 2023-01-21 RX ADMIN — GABAPENTIN 300 MILLIGRAM(S): 400 CAPSULE ORAL at 05:22

## 2023-01-21 RX ADMIN — Medication 650 MILLIGRAM(S): at 05:22

## 2023-01-21 RX ADMIN — GABAPENTIN 300 MILLIGRAM(S): 400 CAPSULE ORAL at 21:26

## 2023-01-21 RX ADMIN — Medication 30 MILLIGRAM(S): at 13:27

## 2023-01-21 RX ADMIN — Medication 650 MILLIGRAM(S): at 23:03

## 2023-01-21 RX ADMIN — SENNA PLUS 2 TABLET(S): 8.6 TABLET ORAL at 21:26

## 2023-01-21 RX ADMIN — PANTOPRAZOLE SODIUM 40 MILLIGRAM(S): 20 TABLET, DELAYED RELEASE ORAL at 05:52

## 2023-01-21 RX ADMIN — Medication 650 MILLIGRAM(S): at 00:00

## 2023-01-21 RX ADMIN — Medication 650 MILLIGRAM(S): at 17:40

## 2023-01-21 RX ADMIN — Medication 30 MILLIGRAM(S): at 21:26

## 2023-01-21 RX ADMIN — Medication 30 MILLIGRAM(S): at 05:22

## 2023-01-21 RX ADMIN — OXYCODONE HYDROCHLORIDE 5 MILLIGRAM(S): 5 TABLET ORAL at 10:32

## 2023-01-21 RX ADMIN — Medication 650 MILLIGRAM(S): at 13:26

## 2023-01-21 RX ADMIN — Medication 30 MILLIGRAM(S): at 17:38

## 2023-01-21 RX ADMIN — Medication 650 MILLIGRAM(S): at 23:02

## 2023-01-21 RX ADMIN — GABAPENTIN 300 MILLIGRAM(S): 400 CAPSULE ORAL at 13:27

## 2023-01-21 RX ADMIN — Medication 30 MILLIGRAM(S): at 21:27

## 2023-01-21 NOTE — DISCHARGE NOTE PROVIDER - CARE PROVIDERS DIRECT ADDRESSES
[email or web address]_Unknown

## 2023-01-21 NOTE — DISCHARGE NOTE PROVIDER - NSDCMRMEDTOKEN_GEN_ALL_CORE_FT
oxyCODONE 5 mg oral capsule: 1 cap(s) orally every 6 hours MDD:4   oxyCODONE 5 mg oral capsule: 1 cap(s) orally every 6 hours MDD:4  oxyCODONE 5 mg oral tablet: 1 tab(s) orally every 8 hours MDD:3 Tabs

## 2023-01-21 NOTE — DISCHARGE NOTE PROVIDER - CARE PROVIDER_API CALL
Alice Stone)  Neurosurgery  [address] [place] [address], [address]  [place] [address]  Phone: [phone]  Fax: [phone]  Follow Up Time: 1 week    Brittney Argueta)  Surgery; Surgical Critical Care  [address] [place] [address]  Phone: [phone]  Fax: [phone]  Follow Up Time:

## 2023-01-21 NOTE — DISCHARGE NOTE PROVIDER - NSDCCPCAREPLAN_GEN_ALL_CORE_FT
PRINCIPAL DISCHARGE DIAGNOSIS  Diagnosis: T8 vertebral fracture  Assessment and Plan of Treatment:       SECONDARY DISCHARGE DIAGNOSES  Diagnosis: Rib fracture  Assessment and Plan of Treatment:

## 2023-01-21 NOTE — DISCHARGE NOTE PROVIDER - NSDCFUADDINST_GEN_ALL_CORE_FT
Please do not ambulate without TLSO brace. You can otherwise have a regular diet, and may take Oxycodone for severe pain. Please call the number provided with your discharge paperwork with make an appointment with Dr. Stone in outpatient neurosurgery clinic in 1-2 weeks.

## 2023-01-21 NOTE — PATIENT PROFILE ADULT - FALL HARM RISK - HARM RISK INTERVENTIONS
Assistance with ambulation/Assistance OOB with selected safe patient handling equipment/Communicate Risk of Fall with Harm to all staff/Discuss with provider need for PT consult/Monitor gait and stability/Reinforce activity limits and safety measures with patient and family/Tailored Fall Risk Interventions/Visual Cue: Yellow wristband and red socks/Bed in lowest position, wheels locked, appropriate side rails in place/Call bell, personal items and telephone in reach/Instruct patient to call for assistance before getting out of bed or chair/Non-slip footwear when patient is out of bed/[place] to call system/Physically safe environment - no spills, clutter or unnecessary equipment/Purposeful Proactive Rounding/Room/bathroom lighting operational, light cord in reach
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
## 2023-01-22 VITALS
RESPIRATION RATE: 18 BRPM | TEMPERATURE: 97 F | SYSTOLIC BLOOD PRESSURE: 148 MMHG | OXYGEN SATURATION: 98 % | HEART RATE: 53 BPM | DIASTOLIC BLOOD PRESSURE: 78 MMHG

## 2023-01-22 PROCEDURE — 99232 SBSQ HOSP IP/OBS MODERATE 35: CPT

## 2023-01-22 RX ORDER — OXYCODONE HYDROCHLORIDE 5 MG/1
1 TABLET ORAL
Qty: 8 | Refills: 0
Start: 2023-01-22

## 2023-01-22 RX ADMIN — Medication 30 MILLIGRAM(S): at 06:12

## 2023-01-22 RX ADMIN — OXYCODONE HYDROCHLORIDE 5 MILLIGRAM(S): 5 TABLET ORAL at 12:52

## 2023-01-22 RX ADMIN — Medication 650 MILLIGRAM(S): at 12:43

## 2023-01-22 RX ADMIN — Medication 650 MILLIGRAM(S): at 06:12

## 2023-01-22 RX ADMIN — Medication 650 MILLIGRAM(S): at 13:15

## 2023-01-22 RX ADMIN — GABAPENTIN 300 MILLIGRAM(S): 400 CAPSULE ORAL at 06:09

## 2023-01-22 RX ADMIN — LIDOCAINE 1 PATCH: 4 CREAM TOPICAL at 12:43

## 2023-01-22 RX ADMIN — Medication 30 MILLIGRAM(S): at 06:09

## 2023-01-22 RX ADMIN — PANTOPRAZOLE SODIUM 40 MILLIGRAM(S): 20 TABLET, DELAYED RELEASE ORAL at 06:09

## 2023-01-22 RX ADMIN — Medication 650 MILLIGRAM(S): at 06:09

## 2023-01-22 NOTE — PHYSICAL THERAPY INITIAL EVALUATION ADULT - ADDITIONAL COMMENTS
Lives with wife in private home 3 stairs without rail to enter, flight in home, able to stay on first floor prn.
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
## 2023-01-22 NOTE — PHYSICAL THERAPY INITIAL EVALUATION ADULT - REHAB POTENTIAL, PT EVAL
PT not indicated in acute setting. Education provided regarding donning and doffing TLSO, fit and use, exercise tolerance and use of pain medication, and follow up with MD to request Outpatient PT prn./none

## 2023-01-22 NOTE — PROGRESS NOTE ADULT - SUBJECTIVE AND OBJECTIVE BOX
GENERAL SURGERY PROGRESS NOTE    Patient: LUZ WANG , 52y ([birth year])Male   MRN: [id number]  Location: [address]  Visit: 01-18-23 Inpatient  Date: 01-22-23 @ 01:36    Hospital Day #: 5    Procedure/Dx/Injuries: TB VB fracture, Rt posterior 8th rib fracture. BL subpleural hematoma    PAST MEDICAL & SURGICAL HISTORY:    Vitals:   T(F): 97.8 (01-21-23 @ 16:00), Max: 97.8 (01-21-23 @ 16:00)  HR: 57 (01-21-23 @ 16:00)  BP: 152/76 (01-21-23 @ 16:00)  RR: 18 (01-21-23 @ 16:00)  SpO2: 98% (01-21-23 @ 16:00)    Diet, Regular    Fluids:     I & O's:    01-20-23 @ 07:01 - 01-21-23 @ 07:00  --------------------------------------------------------  IN:  Total IN: 0 mL    OUT:    Voided (mL): 1400 mL  Total OUT: 1400 mL    Total NET: -1400 mL    PHYSICAL EXAM:  GENERAL: NAD, well-appearing  CHEST/LUNG: Clear to auscultation bilaterally  HEART: Regular rate and rhythm  ABDOMEN: Soft, Nontender, Nondistended;   EXTREMITIES:  No clubbing, cyanosis, or edema    MEDICATIONS  (STANDING):  acetaminophen     Tablet .. 650 milliGRAM(s) Oral every 6 hours  enoxaparin Injectable 40 milliGRAM(s) SubCutaneous every 24 hours  gabapentin 300 milliGRAM(s) Oral every 8 hours  ketorolac   Injectable 30 milliGRAM(s) IV Push every 8 hours  lidocaine   4% Patch 1 Patch Transdermal daily  pantoprazole    Tablet 40 milliGRAM(s) Oral before breakfast  senna 2 Tablet(s) Oral at bedtime    MEDICATIONS  (PRN):  oxyCODONE    IR 5 milliGRAM(s) Oral every 8 hours PRN Severe Pain (7 - 10)    DVT PROPHYLAXIS: enoxaparin Injectable 40 milliGRAM(s) SubCutaneous every 24 hours    GI PROPHYLAXIS: pantoprazole    Tablet 40 milliGRAM(s) Oral before breakfast    ANTICOAGULATION:   ANTIBIOTICS:      LAB/STUDIES:  Labs:  CAPILLARY BLOOD GLUCOSE               12.9   9.94  )-----------( 187      ( 20 Jan 2023 21:15 )             39.1       01-20    139  |  104  |  12  ----------------------------<  111<H>  3.7   |  30  |  1.1    LFTs:    Coags:    CARDIAC MARKERS ( 20 Jan 2023 21:15 )  x     / x     / 239 U/L / x     / x
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
## 2023-01-22 NOTE — DISCHARGE NOTE NURSING/CASE MANAGEMENT/SOCIAL WORK - PATIENT PORTAL LINK FT
You can access the FollowMyHealth Patient Portal offered by [facility] by registering at the following website: [email or web address]. By joining [employer]’s FollowMyHealth portal, you will also be able to view your health information using other applications (apps) compatible with our system.

## 2023-01-26 DIAGNOSIS — S22.31XA FRACTURE OF ONE RIB, RIGHT SIDE, INITIAL ENCOUNTER FOR CLOSED FRACTURE: ICD-10-CM

## 2023-01-26 DIAGNOSIS — S24.103A UNSPECIFIED INJURY AT T7-T10 LEVEL OF THORACIC SPINAL CORD, INITIAL ENCOUNTER: ICD-10-CM

## 2023-01-26 DIAGNOSIS — S06.0X1A CONCUSSION WITH LOSS OF CONSCIOUSNESS OF 30 MINUTES OR LESS, INITIAL ENCOUNTER: ICD-10-CM

## 2023-01-26 DIAGNOSIS — Z88.0 ALLERGY STATUS TO PENICILLIN: ICD-10-CM
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
## 2023-01-26 DIAGNOSIS — S22.069A UNSPECIFIED FRACTURE OF T7-T8 VERTEBRA, INITIAL ENCOUNTER FOR CLOSED FRACTURE: ICD-10-CM

## 2023-01-26 DIAGNOSIS — S40.011A CONTUSION OF RIGHT SHOULDER, INITIAL ENCOUNTER: ICD-10-CM

## 2023-01-26 DIAGNOSIS — S30.1XXA CONTUSION OF ABDOMINAL WALL, INITIAL ENCOUNTER: ICD-10-CM
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
## 2025-04-24 NOTE — PATIENT PROFILE ADULT - WAS YOUR LAST COVID-19 VACCINE GREATER THAN OR EQUAL TO TWO MONTHS AGO?
[Return in ____ week(s)] : Return in [unfilled] week(s) [FreeTextEntry2] : Support pt/family with increasing portions and frequency/consistency of meals. [de-identified] : Patient and mother presented for session together. Earlier in the day, mother had reached out to inform therapist about the food limitations of [place], which she felt had impacted pt's eating since it began on 4/12. Per prev session's goal of incorporating protein bars into snack before softball, pt/mom reported pt forgot. Pt/mom/therapist also talked through pt's eating since holiday began, with pt acknowledging that she had been , while pointing out foods/meals she did eat (mostly desserts), as well as reasons why she didn't (dishes were "bad" or had gotten cold). First half of session was spent establishing plan for protein bars (mom to put pt's bar in her cleat so she will see it; if she doesn't eat, pt's  to remind her before practice, which pt agreed to). Second half of pt was spent doing further assessment about pt's hunger levels (talked through hunger scale) and brainstorming ways to give pt some agency in what she eats while also empowering mom to insist pt gets adequate nutrition. Pt/mom receptive to suggestion of using hunger scale at home and protein bar plan.   Yes